# Patient Record
Sex: MALE | Race: WHITE
[De-identification: names, ages, dates, MRNs, and addresses within clinical notes are randomized per-mention and may not be internally consistent; named-entity substitution may affect disease eponyms.]

---

## 2018-10-08 ENCOUNTER — HOSPITAL ENCOUNTER (OUTPATIENT)
Dept: HOSPITAL 89 - RAD | Age: 48
End: 2018-10-08
Attending: FAMILY MEDICINE
Payer: COMMERCIAL

## 2018-10-08 DIAGNOSIS — S62.91XA: Primary | ICD-10-CM

## 2018-10-08 NOTE — RADIOLOGY IMAGING REPORT
FACILITY: [facility] 

 

PATIENT NAME: Mandeep Graham

: 1970

MR: [id number]

V: [id number]

EXAM DATE: 

ORDERING PHYSICIAN: MITALI BECKER

TECHNOLOGIST: 

 

Location: [facility]

Patient: Mandeep Graham

: 1970

MRN: [id number]

Visit/Account:[id number]

Date of Sevice: 10/08/2018

 

ACCESSION #: [id number]

 

Right wrist, 2 views.

 

HISTORY: Right wrist pain, previous surgery.

 

COMPARISON: 2013.

 

A moderate deformity is present in the distal radius consistent with a healed fracture. A metal plate
 has been applied along the volar aspect of the distal radius. The plate has been secured with metal 
screws. A metal plate is present along the ulnar aspect of the distal ulna. The plate has been secure
d with metal screws. A bony protuberance measuring 2 cm in length is present along the dorsal aspect 
of the distal radius. Minimal joint space narrowing is present in the radiocarpal joint. The bones, j
oints, and soft tissues are otherwise unremarkable.

 

IMPRESSION:

 

Healed distal radius and ulna fractures.

 

Bony protuberance along the distal radius consistent with sequela of healing.

 

Mild radiocarpal osteoarthritis.

 

Report Dictated By: Adriel Pinto MD at 10/8/2018 7:16 PM

 

Report E-Signed By: Adriel Pinto MD  at 10/8/2018 7:20 PM

 

WSN:[id number]

## 2019-08-10 ENCOUNTER — HOSPITAL ENCOUNTER (OUTPATIENT)
Dept: HOSPITAL 89 - LAB | Age: 49
End: 2019-08-10
Attending: NURSE PRACTITIONER
Payer: COMMERCIAL

## 2019-08-10 ENCOUNTER — HOSPITAL ENCOUNTER (OUTPATIENT)
Dept: HOSPITAL 89 - CT | Age: 49
End: 2019-08-10
Attending: NURSE PRACTITIONER
Payer: COMMERCIAL

## 2019-08-10 DIAGNOSIS — D18.09: ICD-10-CM

## 2019-08-10 DIAGNOSIS — K76.0: Primary | ICD-10-CM

## 2019-08-10 DIAGNOSIS — R10.9: Primary | ICD-10-CM

## 2019-08-10 LAB — PLATELET COUNT, AUTOMATED: 298 K/UL (ref 150–450)

## 2019-08-10 PROCEDURE — 82565 ASSAY OF CREATININE: CPT

## 2019-08-10 PROCEDURE — 84520 ASSAY OF UREA NITROGEN: CPT

## 2019-08-10 PROCEDURE — 82310 ASSAY OF CALCIUM: CPT

## 2019-08-10 PROCEDURE — 82374 ASSAY BLOOD CARBON DIOXIDE: CPT

## 2019-08-10 PROCEDURE — 84132 ASSAY OF SERUM POTASSIUM: CPT

## 2019-08-10 PROCEDURE — 84295 ASSAY OF SERUM SODIUM: CPT

## 2019-08-10 PROCEDURE — 82435 ASSAY OF BLOOD CHLORIDE: CPT

## 2019-08-10 PROCEDURE — 82040 ASSAY OF SERUM ALBUMIN: CPT

## 2019-08-10 PROCEDURE — 84075 ASSAY ALKALINE PHOSPHATASE: CPT

## 2019-08-10 PROCEDURE — 84155 ASSAY OF PROTEIN SERUM: CPT

## 2019-08-10 PROCEDURE — 82947 ASSAY GLUCOSE BLOOD QUANT: CPT

## 2019-08-10 PROCEDURE — 82247 BILIRUBIN TOTAL: CPT

## 2019-08-10 PROCEDURE — 85025 COMPLETE CBC W/AUTO DIFF WBC: CPT

## 2019-08-10 PROCEDURE — 84460 ALANINE AMINO (ALT) (SGPT): CPT

## 2019-08-10 PROCEDURE — 74177 CT ABD & PELVIS W/CONTRAST: CPT

## 2019-08-10 PROCEDURE — 84450 TRANSFERASE (AST) (SGOT): CPT

## 2024-02-17 NOTE — RADIOLOGY IMAGING REPORT
FACILITY: [facility] - [place], [place] 

 

PATIENT NAME: Mandeep Graham

: 1970

MR: [id number]

V: [id number]

EXAM DATE: 

ORDERING PHYSICIAN: SARAH CATHERINE

TECHNOLOGIST: 

 

Location: [facility]

Patient: Mandeep Graham

: 1970

MRN: [id number]

Visit/Account:[id number]

Date of Sevice:  8/10/2019

 

ACCESSION #: [id number]

 

EXAMINATION: CT abdomen and pelvis with IV contrast

 

HISTORY:  Abdominal pain.  History of diverticulitis.

 

TECHNIQUE:   Axial CT images of the abdomen and pelvis were obtained with IV contrast, with coronal a
nd sagittal 2D reconstructed images.

One of the following dose optimization techniques was utilized in the performance of this exam: Autom
ated exposure control; adjustment of the mA and/or kV according to the patient's size; or use of an i
terative  reconstruction technique.  Specific details can be referenced in the facility's radiology C
T exam operational policy.

 

Contrast:  75 mL of IV Isovue-370.

 

COMPARISON:  2017.

 

FINDINGS:

Liver:  Normal hepatic size and morphology.  Fatty infiltration of the liver.  Stable 3.9 cm cavernou
s hemangioma in the posterior right hepatic lobe.  No other focal liver lesion.  The hepatic veins an
d portal veins are patent.

 

Gallbladder and bile ducts:  Negative.

 

Spleen:  Negative.

 

Pancreas:  Negative.

 

Adrenal glands:  Negative.

 

Kidneys:  Stable subcentimeter hypodensity in the left kidney, likely a small cyst.  The kidneys enha
nce normally.  No urinary calculi or hydronephrosis.

 

Bowel and peritoneum:  The small bowel and colon are normal in caliber, without evidence of obstructi
on or any focal inflammatory process.  No bowel wall thickening.  No discrete colonic diverticula are
 visualized.  Normal appendix in the right lower abdomen.  No free fluid or free intraperitoneal air.


 

Pelvic  structures:  Negative.

 

Lymph node assessment:  Negative.

 

Vessels:  Mild vascular calcifications.  Normal caliber abdominal aorta.

 

Musculoskeletal:   Negative.

 

Body wall:  Negative.

 

Lung bases:  Negative.

 

IMPRESSION:

1.  No CT evidence of acute intra-abdominal pathology.  No specific source of abdominal pain is ident
ified.

2.  Normal appendix.

3.  Fatty infiltration the liver.  Stable 3.9 cm cavernous hemangioma in the right hepatic lobe.

 

 

Report Dictated By: Dean Mclean MD at 8/10/2019 3:41 PM

 

Report E-Signed By: Dean Mclean MD  at 8/10/2019 3:46 PM

 

WSN:[id number]
Risks/benefits discussed with patient/surrogate